# Patient Record
Sex: FEMALE | Race: WHITE | NOT HISPANIC OR LATINO | Employment: UNEMPLOYED | URBAN - METROPOLITAN AREA
[De-identification: names, ages, dates, MRNs, and addresses within clinical notes are randomized per-mention and may not be internally consistent; named-entity substitution may affect disease eponyms.]

---

## 2021-06-02 ENCOUNTER — RECORDS - HEALTHEAST (OUTPATIENT)
Dept: ADMINISTRATIVE | Facility: CLINIC | Age: 31
End: 2021-06-02

## 2022-02-12 ENCOUNTER — TRANSFERRED RECORDS (OUTPATIENT)
Dept: HEALTH INFORMATION MANAGEMENT | Facility: CLINIC | Age: 32
End: 2022-02-12
Payer: COMMERCIAL

## 2022-02-12 ENCOUNTER — HOSPITAL ENCOUNTER (INPATIENT)
Facility: CLINIC | Age: 32
LOS: 1 days | Discharge: HOME HEALTH | End: 2022-02-14
Attending: OBSTETRICS & GYNECOLOGY | Admitting: REGISTERED NURSE
Payer: COMMERCIAL

## 2022-02-12 ENCOUNTER — MEDICAL CORRESPONDENCE (OUTPATIENT)
Dept: HEALTH INFORMATION MANAGEMENT | Facility: CLINIC | Age: 32
End: 2022-02-12
Payer: COMMERCIAL

## 2022-02-12 PROCEDURE — 722N000001 HC LABOR CARE VAGINAL DELIVERY SINGLE

## 2022-02-13 PROBLEM — U07.1 SARS-COV-2 POSITIVE: Status: ACTIVE | Noted: 2022-02-13

## 2022-02-13 PROBLEM — Z67.91 RH NEGATIVE STATE IN ANTEPARTUM PERIOD: Status: ACTIVE | Noted: 2022-02-13

## 2022-02-13 PROBLEM — O26.899 RH NEGATIVE STATE IN ANTEPARTUM PERIOD: Status: ACTIVE | Noted: 2022-02-13

## 2022-02-13 PROBLEM — Z36.89 ENCOUNTER FOR TRIAGE IN PREGNANT PATIENT: Status: ACTIVE | Noted: 2022-02-13

## 2022-02-13 PROBLEM — R03.0 ELEVATED BLOOD PRESSURE READING WITHOUT DIAGNOSIS OF HYPERTENSION: Status: ACTIVE | Noted: 2022-02-13

## 2022-02-13 PROBLEM — O26.00 EXCESSIVE WEIGHT GAIN DURING PREGNANCY, ANTEPARTUM: Status: ACTIVE | Noted: 2022-02-13

## 2022-02-13 PROBLEM — Z34.93 NORMAL PREGNANCY IN THIRD TRIMESTER: Status: ACTIVE | Noted: 2022-02-13

## 2022-02-13 PROBLEM — O47.9 THREATENED LABOR AT TERM: Status: ACTIVE | Noted: 2022-02-13

## 2022-02-13 PROBLEM — N93.9 VAGINAL BLEEDING: Status: ACTIVE | Noted: 2022-02-13

## 2022-02-13 LAB
ABO/RH(D): ABNORMAL
ALT SERPL W P-5'-P-CCNC: 22 U/L (ref 0–50)
ANTIBODY ID: NORMAL
ANTIBODY SCREEN: POSITIVE
AST SERPL W P-5'-P-CCNC: 14 U/L (ref 0–45)
CREAT SERPL-MCNC: 0.71 MG/DL (ref 0.52–1.04)
CREAT UR-MCNC: 219 MG/DL
ERYTHROCYTE [DISTWIDTH] IN BLOOD BY AUTOMATED COUNT: 12.7 % (ref 10–15)
GFR SERPL CREATININE-BSD FRML MDRD: >90 ML/MIN/1.73M2
HCT VFR BLD AUTO: 42.3 % (ref 35–47)
HGB BLD-MCNC: 13.8 G/DL (ref 11.7–15.7)
MCH RBC QN AUTO: 29.6 PG (ref 26.5–33)
MCHC RBC AUTO-ENTMCNC: 32.6 G/DL (ref 31.5–36.5)
MCV RBC AUTO: 91 FL (ref 78–100)
PLATELET # BLD AUTO: 226 10E3/UL (ref 150–450)
PROT UR-MCNC: 0.29 G/L
PROT/CREAT 24H UR: 0.13 G/G CR (ref 0–0.2)
RBC # BLD AUTO: 4.67 10E6/UL (ref 3.8–5.2)
SPECIMEN EXPIRATION DATE: ABNORMAL
SPECIMEN EXPIRATION DATE: NORMAL
T PALLIDUM AB SER QL: NONREACTIVE
WBC # BLD AUTO: 20.7 10E3/UL (ref 4–11)

## 2022-02-13 PROCEDURE — 84450 TRANSFERASE (AST) (SGOT): CPT | Performed by: REGISTERED NURSE

## 2022-02-13 PROCEDURE — 59409 OBSTETRICAL CARE: CPT | Performed by: REGISTERED NURSE

## 2022-02-13 PROCEDURE — 250N000013 HC RX MED GY IP 250 OP 250 PS 637: Performed by: REGISTERED NURSE

## 2022-02-13 PROCEDURE — 84460 ALANINE AMINO (ALT) (SGPT): CPT | Performed by: REGISTERED NURSE

## 2022-02-13 PROCEDURE — 0HQ9XZZ REPAIR PERINEUM SKIN, EXTERNAL APPROACH: ICD-10-PCS | Performed by: REGISTERED NURSE

## 2022-02-13 PROCEDURE — 36415 COLL VENOUS BLD VENIPUNCTURE: CPT | Performed by: REGISTERED NURSE

## 2022-02-13 PROCEDURE — 82565 ASSAY OF CREATININE: CPT | Performed by: REGISTERED NURSE

## 2022-02-13 PROCEDURE — 120N000002 HC R&B MED SURG/OB UMMC

## 2022-02-13 PROCEDURE — 85027 COMPLETE CBC AUTOMATED: CPT | Performed by: REGISTERED NURSE

## 2022-02-13 PROCEDURE — 86850 RBC ANTIBODY SCREEN: CPT | Performed by: REGISTERED NURSE

## 2022-02-13 PROCEDURE — 86780 TREPONEMA PALLIDUM: CPT | Performed by: REGISTERED NURSE

## 2022-02-13 PROCEDURE — 86870 RBC ANTIBODY IDENTIFICATION: CPT | Performed by: REGISTERED NURSE

## 2022-02-13 PROCEDURE — 250N000011 HC RX IP 250 OP 636: Performed by: REGISTERED NURSE

## 2022-02-13 PROCEDURE — 84156 ASSAY OF PROTEIN URINE: CPT | Performed by: REGISTERED NURSE

## 2022-02-13 PROCEDURE — 250N000009 HC RX 250: Performed by: REGISTERED NURSE

## 2022-02-13 PROCEDURE — 86901 BLOOD TYPING SEROLOGIC RH(D): CPT | Performed by: REGISTERED NURSE

## 2022-02-13 RX ORDER — METHYLERGONOVINE MALEATE 0.2 MG/ML
200 INJECTION INTRAVENOUS
Status: DISCONTINUED | OUTPATIENT
Start: 2022-02-13 | End: 2022-02-14 | Stop reason: HOSPADM

## 2022-02-13 RX ORDER — PROCHLORPERAZINE 25 MG
25 SUPPOSITORY, RECTAL RECTAL EVERY 12 HOURS PRN
Status: DISCONTINUED | OUTPATIENT
Start: 2022-02-13 | End: 2022-02-13

## 2022-02-13 RX ORDER — CARBOPROST TROMETHAMINE 250 UG/ML
250 INJECTION, SOLUTION INTRAMUSCULAR
Status: DISCONTINUED | OUTPATIENT
Start: 2022-02-13 | End: 2022-02-13

## 2022-02-13 RX ORDER — TRANEXAMIC ACID 10 MG/ML
1 INJECTION, SOLUTION INTRAVENOUS EVERY 30 MIN PRN
Status: DISCONTINUED | OUTPATIENT
Start: 2022-02-13 | End: 2022-02-14 | Stop reason: HOSPADM

## 2022-02-13 RX ORDER — NALOXONE HYDROCHLORIDE 0.4 MG/ML
0.2 INJECTION, SOLUTION INTRAMUSCULAR; INTRAVENOUS; SUBCUTANEOUS
Status: DISCONTINUED | OUTPATIENT
Start: 2022-02-13 | End: 2022-02-13

## 2022-02-13 RX ORDER — SODIUM CHLORIDE, SODIUM LACTATE, POTASSIUM CHLORIDE, CALCIUM CHLORIDE 600; 310; 30; 20 MG/100ML; MG/100ML; MG/100ML; MG/100ML
INJECTION, SOLUTION INTRAVENOUS CONTINUOUS
Status: DISCONTINUED | OUTPATIENT
Start: 2022-02-13 | End: 2022-02-13

## 2022-02-13 RX ORDER — MODIFIED LANOLIN
OINTMENT (GRAM) TOPICAL
Status: DISCONTINUED | OUTPATIENT
Start: 2022-02-13 | End: 2022-02-14 | Stop reason: HOSPADM

## 2022-02-13 RX ORDER — METOCLOPRAMIDE 10 MG/1
10 TABLET ORAL EVERY 6 HOURS PRN
Status: DISCONTINUED | OUTPATIENT
Start: 2022-02-13 | End: 2022-02-13

## 2022-02-13 RX ORDER — MISOPROSTOL 200 UG/1
400 TABLET ORAL
Status: DISCONTINUED | OUTPATIENT
Start: 2022-02-13 | End: 2022-02-13

## 2022-02-13 RX ORDER — IBUPROFEN 800 MG/1
800 TABLET, FILM COATED ORAL EVERY 6 HOURS PRN
Status: DISCONTINUED | OUTPATIENT
Start: 2022-02-13 | End: 2022-02-14 | Stop reason: HOSPADM

## 2022-02-13 RX ORDER — OXYTOCIN 10 [USP'U]/ML
10 INJECTION, SOLUTION INTRAMUSCULAR; INTRAVENOUS
Status: DISCONTINUED | OUTPATIENT
Start: 2022-02-13 | End: 2022-02-13

## 2022-02-13 RX ORDER — OXYTOCIN 10 [USP'U]/ML
10 INJECTION, SOLUTION INTRAMUSCULAR; INTRAVENOUS
Status: DISCONTINUED | OUTPATIENT
Start: 2022-02-13 | End: 2022-02-14 | Stop reason: HOSPADM

## 2022-02-13 RX ORDER — DOCUSATE SODIUM 100 MG/1
100 CAPSULE, LIQUID FILLED ORAL DAILY
Status: DISCONTINUED | OUTPATIENT
Start: 2022-02-13 | End: 2022-02-14 | Stop reason: HOSPADM

## 2022-02-13 RX ORDER — MISOPROSTOL 200 UG/1
800 TABLET ORAL
Status: DISCONTINUED | OUTPATIENT
Start: 2022-02-13 | End: 2022-02-14 | Stop reason: HOSPADM

## 2022-02-13 RX ORDER — TRANEXAMIC ACID 10 MG/ML
1 INJECTION, SOLUTION INTRAVENOUS EVERY 30 MIN PRN
Status: DISCONTINUED | OUTPATIENT
Start: 2022-02-13 | End: 2022-02-13

## 2022-02-13 RX ORDER — MISOPROSTOL 200 UG/1
400 TABLET ORAL
Status: DISCONTINUED | OUTPATIENT
Start: 2022-02-13 | End: 2022-02-14 | Stop reason: HOSPADM

## 2022-02-13 RX ORDER — PROCHLORPERAZINE MALEATE 10 MG
10 TABLET ORAL EVERY 6 HOURS PRN
Status: DISCONTINUED | OUTPATIENT
Start: 2022-02-13 | End: 2022-02-13

## 2022-02-13 RX ORDER — BISACODYL 10 MG
10 SUPPOSITORY, RECTAL RECTAL DAILY PRN
Status: DISCONTINUED | OUTPATIENT
Start: 2022-02-13 | End: 2022-02-14 | Stop reason: HOSPADM

## 2022-02-13 RX ORDER — LIDOCAINE 40 MG/G
CREAM TOPICAL
Status: DISCONTINUED | OUTPATIENT
Start: 2022-02-13 | End: 2022-02-13 | Stop reason: HOSPADM

## 2022-02-13 RX ORDER — ONDANSETRON 2 MG/ML
4 INJECTION INTRAMUSCULAR; INTRAVENOUS EVERY 6 HOURS PRN
Status: DISCONTINUED | OUTPATIENT
Start: 2022-02-13 | End: 2022-02-13

## 2022-02-13 RX ORDER — FENTANYL CITRATE 50 UG/ML
50-100 INJECTION, SOLUTION INTRAMUSCULAR; INTRAVENOUS
Status: DISCONTINUED | OUTPATIENT
Start: 2022-02-13 | End: 2022-02-13

## 2022-02-13 RX ORDER — HYDROCORTISONE 2.5 %
CREAM (GRAM) TOPICAL 3 TIMES DAILY PRN
Status: DISCONTINUED | OUTPATIENT
Start: 2022-02-13 | End: 2022-02-14 | Stop reason: HOSPADM

## 2022-02-13 RX ORDER — MISOPROSTOL 200 UG/1
800 TABLET ORAL
Status: DISCONTINUED | OUTPATIENT
Start: 2022-02-13 | End: 2022-02-13

## 2022-02-13 RX ORDER — METHYLERGONOVINE MALEATE 0.2 MG/ML
200 INJECTION INTRAVENOUS
Status: DISCONTINUED | OUTPATIENT
Start: 2022-02-13 | End: 2022-02-13

## 2022-02-13 RX ORDER — OXYTOCIN/0.9 % SODIUM CHLORIDE 30/500 ML
100-340 PLASTIC BAG, INJECTION (ML) INTRAVENOUS CONTINUOUS PRN
Status: DISCONTINUED | OUTPATIENT
Start: 2022-02-13 | End: 2022-02-13

## 2022-02-13 RX ORDER — CARBOPROST TROMETHAMINE 250 UG/ML
250 INJECTION, SOLUTION INTRAMUSCULAR
Status: DISCONTINUED | OUTPATIENT
Start: 2022-02-13 | End: 2022-02-14 | Stop reason: HOSPADM

## 2022-02-13 RX ORDER — ONDANSETRON 4 MG/1
4 TABLET, ORALLY DISINTEGRATING ORAL EVERY 6 HOURS PRN
Status: DISCONTINUED | OUTPATIENT
Start: 2022-02-13 | End: 2022-02-13

## 2022-02-13 RX ORDER — OXYTOCIN/0.9 % SODIUM CHLORIDE 30/500 ML
340 PLASTIC BAG, INJECTION (ML) INTRAVENOUS CONTINUOUS PRN
Status: DISCONTINUED | OUTPATIENT
Start: 2022-02-13 | End: 2022-02-13

## 2022-02-13 RX ORDER — ACETAMINOPHEN 325 MG/1
650 TABLET ORAL EVERY 4 HOURS PRN
Status: DISCONTINUED | OUTPATIENT
Start: 2022-02-13 | End: 2022-02-14 | Stop reason: HOSPADM

## 2022-02-13 RX ORDER — NALOXONE HYDROCHLORIDE 0.4 MG/ML
0.4 INJECTION, SOLUTION INTRAMUSCULAR; INTRAVENOUS; SUBCUTANEOUS
Status: DISCONTINUED | OUTPATIENT
Start: 2022-02-13 | End: 2022-02-13

## 2022-02-13 RX ORDER — KETOROLAC TROMETHAMINE 30 MG/ML
30 INJECTION, SOLUTION INTRAMUSCULAR; INTRAVENOUS
Status: DISCONTINUED | OUTPATIENT
Start: 2022-02-13 | End: 2022-02-13

## 2022-02-13 RX ORDER — OXYTOCIN/0.9 % SODIUM CHLORIDE 30/500 ML
340 PLASTIC BAG, INJECTION (ML) INTRAVENOUS CONTINUOUS PRN
Status: DISCONTINUED | OUTPATIENT
Start: 2022-02-13 | End: 2022-02-14 | Stop reason: HOSPADM

## 2022-02-13 RX ORDER — METOCLOPRAMIDE HYDROCHLORIDE 5 MG/ML
10 INJECTION INTRAMUSCULAR; INTRAVENOUS EVERY 6 HOURS PRN
Status: DISCONTINUED | OUTPATIENT
Start: 2022-02-13 | End: 2022-02-13

## 2022-02-13 RX ORDER — IBUPROFEN 600 MG/1
600 TABLET, FILM COATED ORAL
Status: DISCONTINUED | OUTPATIENT
Start: 2022-02-13 | End: 2022-02-13

## 2022-02-13 RX ADMIN — IBUPROFEN 800 MG: 800 TABLET, FILM COATED ORAL at 22:31

## 2022-02-13 RX ADMIN — MISOPROSTOL 400 MCG: 200 TABLET ORAL at 05:42

## 2022-02-13 RX ADMIN — ACETAMINOPHEN 650 MG: 325 TABLET, FILM COATED ORAL at 22:31

## 2022-02-13 RX ADMIN — Medication 340 ML/HR: at 05:42

## 2022-02-13 RX ADMIN — KETOROLAC TROMETHAMINE 30 MG: 30 INJECTION, SOLUTION INTRAMUSCULAR at 06:27

## 2022-02-13 RX ADMIN — DOCUSATE SODIUM 100 MG: 100 CAPSULE, LIQUID FILLED ORAL at 08:09

## 2022-02-13 RX ADMIN — HUMAN RHO(D) IMMUNE GLOBULIN 300 MCG: 1500 SOLUTION INTRAMUSCULAR; INTRAVENOUS at 22:20

## 2022-02-13 RX ADMIN — ACETAMINOPHEN 650 MG: 325 TABLET, FILM COATED ORAL at 09:09

## 2022-02-13 ASSESSMENT — ACTIVITIES OF DAILY LIVING (ADL)
TOILETING_ISSUES: NO
FALL_HISTORY_WITHIN_LAST_SIX_MONTHS: NO

## 2022-02-13 ASSESSMENT — MIFFLIN-ST. JEOR: SCORE: 1556.84

## 2022-02-13 NOTE — PLAN OF CARE
VSS. Pt denies need for PRN pain medications at this time, stated she will call RN if Tylenol or Ibuprofen needed. Pt breastfeeding infant with assistance for latch. Pt also encouraged to hand express after each feeding to help bring in strong supply. Pt tolerating regular diet, ambulating in room, and voiding without difficulty. Postpartum checks WNL. Pt and spouse bonding well with infant.

## 2022-02-13 NOTE — PROGRESS NOTES
Patient arrived to [facility] unit via wheelchair at 1310,with belongings, accompanied by spouse/ significant other, with infant in arms. Received report from Chelsea Clifford RN and checked bands. Unit and room orientation completd. Call light given and within arms reach; no concerns present at this time. Continue with plan of care.

## 2022-02-13 NOTE — PROGRESS NOTES
"Labor progress note    S:  Feeling urge to push and requesting cervical exam    O:  Blood pressure 111/79, height 1.575 m (5' 2\"), weight 89.4 kg (197 lb).  General appearance: uncomfortable with contractions.  Contractions: Every 2-3 minutes, intermittently coupling. Tracing via HELEN. 60-90 seconds duration.  Palpate: strong.  FHT: Baseline 150 with moderate variability. Accelerations present. Early decelerations present.  ROM: moderate meconium fluid. Membranes have been ruptured for 0 hours.  Pelvic exam: 10/ 100%/ Anterior/ soft/ 0 to +1  -------------------------------  Pitocin- none,  Antibiotics- none    A:  IUP @ 39+3 second stage labor   Fetal Heart rate tracing Category one overall  GBS- negative  Patient Active Problem List   Diagnosis     Encounter for triage in pregnant patient     Threatened labor at term     Normal pregnancy in third trimester     SARS-CoV-2 positive     Rh negative state in antepartum period     Vaginal bleeding     Excessive weight gain during pregnancy, antepartum         P:  Begin pushing     OG Zambrano CNM      "

## 2022-02-13 NOTE — PROVIDER NOTIFICATION
02/13/22 1510   Provider Notification   Provider Name/Title ELAINA Man CNM   Method of Notification Phone   Notification Reason Other (Comment)     Prior to start of 0700 shift, placenta was released to pt and picked up by ned for planned encapsulation.   Release form signed.   Pt states that  aware that pt is COVID +, has not discussed risk associated with encapsulation given current COVID status.   CNM to follow up with pt and discuss plan for placenta.

## 2022-02-13 NOTE — L&D DELIVERY NOTE
Delivery Summary    Maribel Lopez MRN# [id number]   Age: 31 year old YOB: 1990     ASSESSMENT & PLAN:     Delivery Note    Labor Course:   Patient was transferred to [facility] labor and delivery from [facility] 2/13/22 at 2330 for r/o preeclampsia and r/o abruption. She had begun to have painful labor contractions at 1830. At the birth center she arrived in early labor and SVE was 0-1cm/60%/-2/post/soft. She was found to have elevated BP x2 133/97 and repeat 20 minutes later was 121/93 so transferring midwife sent her by car for preeclampsia workup. Upon arrival to the unit she was brian every 2-3 minutes and they palpated moderate. Bps normotensive, HELLP labs were all WNL. Small amount of bloody show and non-tender uterus and Category I FHR tracing suggested low clinical suspicion for abruption. Her birth plan stated she wanted minimal cervical exams, so she continued to labor spontaneously. She labored in hands and knees, on birthing ball and in the tub. At 96331 she reported urge to push and was pushing involuntarily with contractions. At 0345 SVE C/C/+1, moderate amount of meconium stained fluid was noted on exam. She pushed spontaneously with verbal guidance and coaching during contractions. She pushed in both right and left side-lying positions and squatting. NICU called to delivery due to meconium-stained fluid. Infant delivered just prior to their arrival.     Delivery Course:  Pt became complete at 0348 and started pushing 0355. Delivered a vigorous baby male at 0520 who was immediately placed on mom's abdomen. Loose nuchal delivered through. IV pitocin started after delivery of infant per protocol. Umbilical cord was double clamped and cut by Vitaliy (MARCELLE) after the cord stopped pulsing. Cord segment cut for gases. Cord blood obtained. Placenta spontaneously delivered intact at 0527 without difficulty via Schultze mechanism. Inspection of vagina and perineum revealed a 1st degree  laceration that was oozing after pressure held to site, so laceration was repaired in the usual fashion with 3-0 vicryl. 1% lidocaine was infiltrated before the repair.  Fundus is firm and midline.  Mom and baby are stable.      IUP at 39+3 weeks gestation delivered on 2022.    , with 1st degree laceration delivery of a viable Male infant.  Weight : 7lb 4.8oz  Apgars of 6 at 1 minute and 7 at 5 minutes and 9 at 10 minutes.  Labor was spontaneous.  Medications administered  in labor:  Pain Rx none; Antibiotics No;   Perineum: 1st degree  Placenta-mechanism: spontaneous, intact,  with a 3 vessel cord. IV oxytocin was given. Buccal misoprostol was given for moderate, brisk bleeding after delivery of placenta.   QBL was 395.  Anticipated Discharge Date: 22  Complications of pregnancy, labor and delivery: meconium stained fluid  Birth attendants:OG Zambrano CNM, REGLA FOLEYkkeBreanna [[id number]]    Labor Event Times    Labor onset date: 22 Onset time:  6:30 PM   Dilation complete date: 22 Complete time:  3:48 AM   Start pushing date/time: 2022 0355      Labor Events     labor?: No   steroids: None  Labor Type: Spontaneous  Predominate monitoring during 1st stage: continuous electronic fetal monitoring     Antibiotics received during labor?: No     Rupture identifier: Sac 1  Rupture date/time: 22 0340   Rupture type: Spontaneous rupture of membranes occuring during spontaneous labor or augmentation  Fluid color: Meconium     Augmentation: None  1:1 continuous labor support provided by?: RN       Delivery/Placenta Date and Time    Delivery Date: 22 Delivery Time:  5:20 AM   Placenta Date/Time: 2022  5:27 AM  Oxytocin given at the time of delivery: after delivery of placenta  Delivering clinician: Sharmila Oconnell APRN CNM          Apgars    Living status: Living   1 Minute 5 Minute 10 Minute 15 Minute 20 Minute   Skin color:  0  0  1      Heart rate: 2  2  2      Reflex irritability: 2  2  2      Muscle tone: 1  2  2      Respiratory effort: 1  1  2      Total: 6  7  9      Apgars assigned by: ANGELIC HANCOCK RN & MAY PARKRN     Cord    Vessels: 3 Vessels    Cord Complications: None               Cord Blood Disposition: Lab    Gases Sent?: Yes    Delayed cord clamping?: Yes    Cord Clamping Delay (seconds): >120 seconds    Stem cell collection?: No        Resuscitation    Methods: Suctioning, Other, Oximetry  Tracheal Suction Passes: 1 Tracheal Returns: Meconium    [place] Care at Delivery: Called to delivery of term male infant. NICU called for meconium stained fluid and then initially dismissed due to vigorous cry. At 5 minute APGAR, infant pale, tachycardic and tachypnic. Infant brought to warmer and NNP called back. See provider note for interventions. NNP performed 1 tracheal suction pass with meconium return, and infant's vitals improved. Infant brought back to mom, no change to standard's of care. Will continue to monitor closely.     Angelic Flores RN on 2022 at 6:06 AM       Labor Events and Shoulder Dystocia    Fetal Tracing Prior to Delivery: Category 2  Fetal Tracing Comments: moderate variability; variable decelerations with pushing contractions  Shoulder dystocia present?: Neg     Delivery (Maternal) (Provider to Complete) ([id number])    Episiotomy: None  Perineal lacerations: 1st Repaired?: Yes   Repair suture: 3-0 Vicryl  Number of repair packets: 1  Genital tract inspection done: Pos     Blood Loss  Mother: Maribel Lopez #[id number]   Start of Mother's Information    Delivery Blood Loss  22 1830 - 22 0624    Delivery QBL (mL) Hospital Encounter 395 mL    Total  395 mL         End of Mother's Information  Mother: Maribel Lopez #[id number]          Delivery - Provider to Complete ([id number])    Delivering clinician: Sharmila Oconnell APRN CNM  CNM Care: Any CNM care in labor, Exclusive CNM care in  labor  Attempted Delivery Types (Choose all that apply): Spontaneous Vaginal Delivery  Delivery Type (Choose the 1 that will go to the Birth History): Vaginal, Spontaneous                                 Placenta    Date/Time: 2/13/2022  5:27 AM  Removal: Spontaneous  Disposition: Patient possesion           Anesthesia    Method: None                Presentation and Position    Presentation: Vertex    Position: Left Occiput Anterior                 OG Zambrano CNM

## 2022-02-13 NOTE — H&P
"ADMIT NOTE  =================  Unknown    Maribel Lopez is a 31 year old female with an No LMP recorded. Patient is pregnant. and Estimated Date of Delivery: 22 is admitted to the Birthplace on 2022 at 11:32 PM with in early labor, r/o preeclampsia, r/o abruption.     HPI  ================  Patient presented to [facility] with signs of early labor. Upon arrival she had an elevated /97 @ 2123 and the repeat BP was 121/93 @ 2158. She denies HA, visual changes, RUQ pain. It was also noted that the patient had a \"moderate amount of dark red bleeding\" noted after her vaginal exam.      She is also COVID+. Became symptomatic 22 and tested positive 22. She feels well now and is no longer symptomatic.     Patient with planned Out of Hospital Birth is transferring by car to the hospital for r/o preeclampsia and r/o abruption.   Patient has been seen by [facility] for prenatal care.  Transferring midwife name(s),/birth center & phone number: Marj Hunter, [phone]  Pager # [phone] option #1   Midwife here supporting patient: no  Records received, reviewed and scanned into chart.   [facility] SBAR transfer tool was used: yes    Contractions- moderate  Fetal movement- active  ROM- no   Vaginal bleeding- 1 clot in the toilet at 1830 per patient and small amount since. Per transferring midwife, vaginal bleeding appeared to be more than expected with bloody show.   GBS- negative  FOB- is involved, Vitaliy  Other labor support- no, considered COVID+    Weight gain- 197 - 154 lbs, Total weight gain- 43 lbs  Height- 5ft 2in  BMI- 27  First prenatal visit at 9 weeks, Total visits- 9    PROBLEM LIST  =================  Patient Active Problem List    Diagnosis Date Noted     Encounter for triage in pregnant patient 2022     Priority: Medium     Threatened labor at term 2022     Priority: Medium     Normal pregnancy in third trimester 2022     Priority: " Medium     SARS-CoV-2 positive 2022     Priority: Medium     Symptomatic 22  Lab test positive 2022       Rh negative state in antepartum period 2022     Priority: Medium     Received rhogam 21       Vaginal bleeding 2022     Priority: Medium     SHAMAR Henrietta BC r/o abruption       Excessive weight gain during pregnancy, antepartum 2022     Priority: Medium     43lbs (BMI prepreg 27)         HISTORIES  ============  Allergies   Allergen Reactions     Mixed Vespid Venom Anaphylaxis and Nausea     Amoxicillin Nausea and Hives     Doxycycline Nausea     No past medical history on file.  No past surgical history on file..  No family history on file.  Social History     Tobacco Use     Smoking status: Not on file     Smokeless tobacco: Not on file   Substance Use Topics     Alcohol use: Not on file     OB History    Para Term  AB Living   1 0 0 0 0 0   SAB IAB Ectopic Multiple Live Births   0 0 0 0 0      # Outcome Date GA Lbr Jose/2nd Weight Sex Delivery Anes PTL Lv   1 Current                 LABS:   ===========  Prenatal Labs reviewed per transfer records:   Rhogam indicated and given on 2021  ABO/ RH: B NEG (antibody negative)  Rubella immune   HBsAg: negative   HIV: negative   RPR: NR   GCT: date 2021, result passed @ 96   GBS: date 22, result negative  OTHER: gc/ct negative  hgb last 12.1  Hep C negative  Urine culture negative       Lab Results   Component Value Date    HGB 13.8 2022     No results found for: GBS  Other labs:  Results for orders placed or performed during the hospital encounter of 22 (from the past 24 hour(s))   CBC with platelets   Result Value Ref Range    WBC Count 20.7 (H) 4.0 - 11.0 10e3/uL    RBC Count 4.67 3.80 - 5.20 10e6/uL    Hemoglobin 13.8 11.7 - 15.7 g/dL    Hematocrit 42.3 35.0 - 47.0 %    MCV 91 78 - 100 fL    MCH 29.6 26.5 - 33.0 pg    MCHC 32.6 31.5 - 36.5 g/dL    RDW 12.7 10.0 - 15.0 %    Platelet Count  226 150 - 450 10e3/uL   ABO/Rh type and screen    Narrative    The following orders were created for panel order ABO/Rh type and screen.  Procedure                               Abnormality         Status                     ---------                               -----------         ------                     Adult Type and Screen[[id number]]                            In process                   Please view results for these tests on the individual orders.       ULTRASOUND  =============  Date 10/4/21, result normal fetal survey    ROS  =========  Pt denies significant respiratory, cardiovacular, GI, or muscular/skeletalcomplaints.    See RN data base ROS.       PHYSICAL EXAM:  ===============  There were no vitals taken for this visit.  General appearance: uncomfortable with contractions  GENERAL APPEARANCE: healthy, alert and no distress  RESP: regular rate and rhythm of breathing.   CV: pulse regular  ABDOMEN:  soft, nontender to palpation, no epigastric pain  SKIN: no suspicious lesions or rashes  NEURO: Denies headache, blurred vision, other vision changes  PSYCH: mentation appears normal. and affect normal/bright  Legs: Reflexes normal bilaterally and No edema     Abdomen: gravid, vertex fetus per Leopold's, non-tender between contractions.   Cephalic presentation confirmed by BSUS  EFW-  7 lbs.   CONTACTIONS: every 2-3 minutes and moderate  FETAL HEART TONES: continuous EFM- baseline 150 with moderate variability and positive accelerations. No decelerations traced. Difficult EFM tracing due to maternal position changes.   PELVIC EXAM: deferred. At 2045 was FT/60%/-2/post/soft  ALLEN SCORE: 5  BLOODY SHOW: yes.    ROM:no  FLUID: none  ROMPLUS: not done    # Pain Assessment:   - Maribel is experiencing pain due to labor contractions. Pain management was discussed with Maribel and her significant other and the plan was created in a collaborative fashion.  Maribel's response to the current recommendations:  engaged  - Non-pharmacologic adjuvants: Massage   - Planning un-medicated delivery        ASSESSMENT:  ==============  IUP @ 39+3 admitted in early labor   NST REACTIVE  Fetal Heart Rate - category one  GBS- negative  COVID+ (still in 10 day window)    PLAN:  ===========  Admit - see IP orders  Continuous EFM   Per patient birth plan, she is aware of pain medication options of nitrous oxide, fentanyl IV and epidural anesthesia and does not want to be offered any. She is aware that she cannot have nitrous oxide due to COVID+ status.   Ambulation, hydration, position changes, birthing ball and tub options to facilitate labor reviewed with pt .  Anticipate   OG Zambrano CNM

## 2022-02-14 VITALS
RESPIRATION RATE: 18 BRPM | BODY MASS INDEX: 36.25 KG/M2 | HEIGHT: 62 IN | WEIGHT: 197 LBS | DIASTOLIC BLOOD PRESSURE: 78 MMHG | SYSTOLIC BLOOD PRESSURE: 113 MMHG | HEART RATE: 85 BPM | TEMPERATURE: 97.8 F

## 2022-02-14 LAB — HGB BLD-MCNC: 11.1 G/DL (ref 11.7–15.7)

## 2022-02-14 PROCEDURE — 85018 HEMOGLOBIN: CPT | Performed by: REGISTERED NURSE

## 2022-02-14 PROCEDURE — 250N000013 HC RX MED GY IP 250 OP 250 PS 637: Performed by: REGISTERED NURSE

## 2022-02-14 PROCEDURE — 36415 COLL VENOUS BLD VENIPUNCTURE: CPT | Performed by: REGISTERED NURSE

## 2022-02-14 RX ORDER — ACETAMINOPHEN 325 MG/1
650 TABLET ORAL EVERY 6 HOURS PRN
Qty: 100 TABLET | Refills: 0 | COMMUNITY
Start: 2022-02-14

## 2022-02-14 RX ORDER — AMOXICILLIN 250 MG
1 CAPSULE ORAL DAILY
Qty: 100 TABLET | Refills: 0 | COMMUNITY
Start: 2022-02-14

## 2022-02-14 RX ORDER — IBUPROFEN 600 MG/1
600 TABLET, FILM COATED ORAL EVERY 6 HOURS PRN
Qty: 60 TABLET | Refills: 0 | COMMUNITY
Start: 2022-02-14

## 2022-02-14 RX ORDER — .BETA.-CAROTENE, ASCORBIC ACID, CHOLECALCIFEROL, .ALPHA.-TOCOPHEROL ACETATE, DL-, THIAMINE MONONITRATE, RIBOFLAVIN, NIACINAMIDE, PYRIDOXINE HYDROCHLORIDE, FOLIC ACID, CYANOCOBALAMIN, CALCIUM PANTOTHENATE, CALCIUM CARBONATE, FERROUS FUMARATE, ZINC OXIDE, AND DOCUSATE SODIUM 1000; 100; 400; 30; 3; 3; 15; 20; 1; 12; 7; 200; 29; 20; 25 [IU]/1; MG/1; [IU]/1; [IU]/1; MG/1; MG/1; MG/1; MG/1; MG/1; UG/1; MG/1; MG/1; MG/1; MG/1; MG/1
1 TABLET, COATED ORAL DAILY
Qty: 90 TABLET | Refills: 0 | COMMUNITY
Start: 2022-02-14

## 2022-02-14 RX ADMIN — IBUPROFEN 800 MG: 800 TABLET, FILM COATED ORAL at 08:54

## 2022-02-14 RX ADMIN — DOCUSATE SODIUM 100 MG: 100 CAPSULE, LIQUID FILLED ORAL at 08:54

## 2022-02-14 RX ADMIN — ACETAMINOPHEN 650 MG: 325 TABLET, FILM COATED ORAL at 08:54

## 2022-02-14 NOTE — PLAN OF CARE
Afebrile. VSS, except 2/10. LS clear on RA. Good PO intake, good appetite. Voiding independently, passing gas. Taking IBU and tylenol as needed for pain. Bonding well with infant in room. Patient is breast feeding every 2-3 hours. Patient discharged. All education reviewed, questions addressed, medication reviewed and verified has medications at home. EDS 4, BC completed, Videos reviewed. Has a Pump at home. Plan to discharge. Hourly monitoring completed. Discharged at 1133.

## 2022-02-14 NOTE — DISCHARGE SUMMARY
[facility] Discharge Summary    Maribel Lopez MRN# [id number]   Age: 31 year old YOB: 1990     Date of Admission:  2022  Date of Discharge::  2022  Admitting Physician:  OG Stallworth CNM  Discharge Physician:  OG Acevedo CNM      Home clinic: [facility]          Admission Diagnoses:   Threatened labor at term [O47.9]          Discharge Diagnosis:     Normal spontaneous vaginal delivery  Intrauterine pregnancy at 39 weeks gestation          Procedures:     Procedure(s): Repair of first degree perineal laceration       No other significant procedures performed during this admission           Medications Prior to Admission:     No medications prior to admission.             Discharge Medications:     Current Discharge Medication List      START taking these medications    Details   acetaminophen (TYLENOL) 325 MG tablet Take 2 tablets (650 mg) by mouth every 6 hours as needed for mild pain Start after Delivery.  Qty: 100 tablet, Refills: 0    Associated Diagnoses:  (normal spontaneous vaginal delivery)      ibuprofen (ADVIL/MOTRIN) 600 MG tablet Take 1 tablet (600 mg) by mouth every 6 hours as needed for moderate pain Start after delivery  Qty: 60 tablet, Refills: 0    Associated Diagnoses:  (normal spontaneous vaginal delivery)      Prenatal Vit-DSS-Fe Fum-FA (PRENATAL 19) 29-1 MG TABS Take 1 tablet by mouth daily  Qty: 90 tablet, Refills: 0    Associated Diagnoses:  (normal spontaneous vaginal delivery)      senna-docusate (SENOKOT-S/PERICOLACE) 8.6-50 MG tablet Take 1 tablet by mouth daily Start after delivery.  Qty: 100 tablet, Refills: 0    Associated Diagnoses:  (normal spontaneous vaginal delivery)                   Consultations:   No consultations were requested during this admission          Brief History of Labor:   Labor Course:   Patient was transferred to [facility] labor and delivery from [facility] 22 at 2330 for r/o  preeclampsia and r/o abruption. She had begun to have painful labor contractions at 1830. At the birth center she arrived in early labor and SVE was 0-1cm/60%/-2/post/soft. She was found to have elevated BP x2 133/97 and repeat 20 minutes later was 121/93 so transferring midwife sent her by car for preeclampsia workup. Upon arrival to the unit she was brian every 2-3 minutes and they palpated moderate. Bps normotensive, HELLP labs were all WNL. Small amount of bloody show and non-tender uterus and Category I FHR tracing suggested low clinical suspicion for abruption. Her birth plan stated she wanted minimal cervical exams, so she continued to labor spontaneously. She labored in hands and knees, on birthing ball and in the tub. At 42129 she reported urge to push and was pushing involuntarily with contractions. At 0345 SVE C/C/+1, moderate amount of meconium stained fluid was noted on exam. She pushed spontaneously with verbal guidance and coaching during contractions. She pushed in both right and left side-lying positions and squatting. NICU called to delivery due to meconium-stained fluid. Infant delivered just prior to their arrival.      Delivery Course:  Pt became complete at 0348 and started pushing 0355. Delivered a vigorous baby male at 0520 who was immediately placed on mom's abdomen. Loose nuchal delivered through. IV pitocin started after delivery of infant per protocol. Umbilical cord was double clamped and cut by Vitaliy (MARCELLE) after the cord stopped pulsing. Cord segment cut for gases. Cord blood obtained. Placenta spontaneously delivered intact at 0527 without difficulty via Schultze mechanism. Inspection of vagina and perineum revealed a 1st degree laceration that was oozing after pressure held to site, so laceration was repaired in the usual fashion with 3-0 vicryl. 1% lidocaine was infiltrated before the repair.  Fundus is firm and midline.  Mom and baby are stable.      IUP at 39+3 weeks  "gestation delivered on 2022.    , with 1st degree laceration delivery of a viable Male infant.  Weight : 7lb 4.8oz  Apgars of 6 at 1 minute and 7 at 5 minutes and 9 at 10 minutes.  Labor was spontaneous.  Medications administered  in labor:  Pain Rx none; Antibiotics No;   Perineum: 1st degree  Placenta-mechanism: spontaneous, intact,  with a 3 vessel cord. IV oxytocin was given. Buccal misoprostol was given for moderate, brisk bleeding after delivery of placenta.   QBL was 395.  Anticipated Discharge Date: 22  Complications of pregnancy, labor and delivery: meconium stained fluid  Birth attendants:Sharmila Oconnell, OG ARAUZ, OG,CNM     Assessment Day of Discharge      Breasts: soft, filling  Nipples:intact, without lesion  Fundus: firm @ umbilicus  Abdomen: soft, nontender  Lochia: small rubra, no clots,  No odor  Perineum: not visualized  Legs: trace no edema, nontender           Hospital Course:     INTERVAL HISTORY:  /80   Pulse 92   Temp 97.8  F (36.6  C) (Oral)   Resp 18   Ht 1.575 m (5' 2\")   Wt 89.4 kg (197 lb)   Breastfeeding Unknown   BMI 36.03 kg/m     No fever  Pt stable, baby boy is rooming in. Feeding well.   Breast feeding status: initiated, feeding on cue without discomfort, intact nipples.   Complications since 2 hours post delivery: None.   Patient is tolerating activity well, and normal diet. Voiding without difficulty, no bowel movement yet, cramping is minimal and is relieved by Ibuprofen, lochia is decreasing and patient denies clots.  Perineal pain is is minimal, is relieved by Ibuprophen and is relieved by ice pack.   The perineum laceration is well approximated and healing appropriately.     Postpartum hemoglobin   Recent Labs   Lab 22  0049   HGB 13.8      Blood type B NEGATIVE -  RHOPHYLAC given at 2220 on 22.   Rubella status : immune    History of depression: yes. Postpartum depression warning signs reviewed.    ASSESSMENT/PLAN:  Normal " postpartum exam , Stable Post-partum day #1  Complications:none  Home Visit Ordered- No  Plan d/c home today  Follow-up: Plans to see midwives at [facility] for postpartum care  Teaching done: D/C Instructions: Nutrition/Activity, Engorgement Management, Warning Signs/When to Call: Excessive Bleeding, Infection, PP Depression, Kegals and Crunches, [facility] Clinic for PP Appointment and PNV     Reviewed blood pressure warning signs including headache, vision changes, RUQ/epigastric pain, N&V, or sudden swelling.     Continue prenatal vitamins  Discharge medications ordered- OTC ibuprofen, tylenol, senna, PNV    Birthcontrol planned: not discussed         Discharge Instructions and Follow-Up:     Discharge diet: Regular   Discharge activity: Pelvic rest: abstain from intercourse and do not use tampons for 6 week(s)   Discharge follow-up: Follow up with [facility] in 1 week   Wound care: Ice to area for comfort           Discharge Disposition:     Discharged to home      OG Acevedo CNM

## 2022-02-14 NOTE — PLAN OF CARE
Problem: Adult Inpatient Plan of Care  Goal: Plan of Care Review  Outcome: Improving  Flowsheets (Taken 2/13/2022 2254)  Plan of Care Reviewed With: patient  Outcome Summary: Patient expressing readiness for discharge in the AM  Progress: improving  Goal: Patient-Specific Goal (Individualized)  Outcome: Improving  Flowsheets  Taken 2/13/2022 2254 by Kim Valverde RN  Patient-Specific Goals (Include Timeframe): Discharge  Taken 2/13/2022 0000 by Sandrine Verdugo RN  Individualized Care Needs: has a written birth plan given to staff  Anxieties, Fears or Concerns: fears of change in birth plan.  Goal: Absence of Hospital-Acquired Illness or Injury  Intervention: Prevent Skin Injury  Recent Flowsheet Documentation  Taken 2/13/2022 2000 by Kim Valverde RN  Body Position: position changed independently  Intervention: Prevent and Manage VTE (Venous Thromboembolism) Risk  Recent Flowsheet Documentation  Taken 2/13/2022 2000 by Kim Valverde RN  VTE Prevention/Management: fluids promoted  Intervention: Prevent Infection  Recent Flowsheet Documentation  Taken 2/13/2022 2000 by Kim Valverde RN  Infection Prevention:   visitors restricted/screened   single patient room provided   rest/sleep promoted   environmental surveillance performed   equipment surfaces disinfected   hand hygiene promoted   personal protective equipment utilized     Problem: Adjustment to Role Transition (Postpartum Vaginal Delivery)  Goal: Successful Maternal Role Transition  Intervention: Support Maternal Role Transition  Recent Flowsheet Documentation  Taken 2/13/2022 2000 by Kim Valverde RN  Supportive Measures:   active listening utilized   decision-making supported   positive reinforcement provided   problem-solving facilitated   relaxation techniques promoted   self-care encouraged   verbalization of feelings encouraged  Parent/Child Attachment Promotion:   caring behavior modeled   cue recognition promoted     Problem: Infection (Postpartum  Vaginal Delivery)  Goal: Absence of Infection Signs and Symptoms  Intervention: Prevent or Manage Infection  Recent Flowsheet Documentation  Taken 2/13/2022 2000 by Kim Valverde RN  Infection Management: aseptic technique maintained     Problem: Pain (Postpartum Vaginal Delivery)  Goal: Acceptable Pain Control  Intervention: Prevent or Manage Pain  Recent Flowsheet Documentation  Taken 2/13/2022 2000 by Kim Valverde RN  Complementary Therapy: essential oils utilized     Problem: Urinary Retention (Postpartum Vaginal Delivery)  Goal: Effective Urinary Elimination  Intervention: Promote Effective Urinary Elimination  Recent Flowsheet Documentation  Taken 2/13/2022 2000 by Kim Valverde RN  Urinary Elimination Promotion: frequent voiding encouraged     Problem: Adult Inpatient Plan of Care  Goal: Plan of Care Review  Outcome: Improving  Flowsheets (Taken 2/13/2022 2254)  Plan of Care Reviewed With: patient  Outcome Summary: Patient expressing readiness for discharge in the AM  Progress: improving  Goal: Patient-Specific Goal (Individualized)  Outcome: Improving  Flowsheets  Taken 2/13/2022 2254 by Kim Valverde RN  Patient-Specific Goals (Include Timeframe): Discharge  Taken 2/13/2022 0000 by Sandrine Verdugo RN  Individualized Care Needs: has a written birth plan given to staff  Anxieties, Fears or Concerns: fears of change in birth plan.  Goal: Absence of Hospital-Acquired Illness or Injury  Outcome: Improving  Intervention: Prevent Skin Injury  Recent Flowsheet Documentation  Taken 2/13/2022 2000 by Kim Valverde RN  Body Position: position changed independently  Intervention: Prevent and Manage VTE (Venous Thromboembolism) Risk  Recent Flowsheet Documentation  Taken 2/13/2022 2000 by Kim Valverde RN  VTE Prevention/Management: fluids promoted  Intervention: Prevent Infection  Recent Flowsheet Documentation  Taken 2/13/2022 2000 by Kim Valverde RN  Infection Prevention:   visitors restricted/screened   single  patient room provided   rest/sleep promoted   environmental surveillance performed   equipment surfaces disinfected   hand hygiene promoted   personal protective equipment utilized  Goal: Optimal Comfort and Wellbeing  Outcome: Improving  Goal: Readiness for Transition of Care  Outcome: Improving     Problem: Adjustment to Role Transition (Postpartum Vaginal Delivery)  Goal: Successful Maternal Role Transition  Outcome: Improving  Intervention: Support Maternal Role Transition  Recent Flowsheet Documentation  Taken 2/13/2022 2000 by Kim Valverde RN  Supportive Measures:   active listening utilized   decision-making supported   positive reinforcement provided   problem-solving facilitated   relaxation techniques promoted   self-care encouraged   verbalization of feelings encouraged  Parent/Child Attachment Promotion:   caring behavior modeled   cue recognition promoted     Problem: Bleeding (Postpartum Vaginal Delivery)  Goal: Hemostasis  Outcome: Improving     Problem: Infection (Postpartum Vaginal Delivery)  Goal: Absence of Infection Signs and Symptoms  Outcome: Improving  Intervention: Prevent or Manage Infection  Recent Flowsheet Documentation  Taken 2/13/2022 2000 by Kim Valverde RN  Infection Management: aseptic technique maintained     Problem: Pain (Postpartum Vaginal Delivery)  Goal: Acceptable Pain Control  Outcome: Improving  Intervention: Prevent or Manage Pain  Recent Flowsheet Documentation  Taken 2/13/2022 2000 by Kim Valverde RN  Complementary Therapy: essential oils utilized     Problem: Urinary Retention (Postpartum Vaginal Delivery)  Goal: Effective Urinary Elimination  Outcome: Improving  Intervention: Promote Effective Urinary Elimination  Recent Flowsheet Documentation  Taken 2/13/2022 2000 by Kim Valverde RN  Urinary Elimination Promotion: frequent voiding encouraged

## 2023-06-04 ENCOUNTER — HEALTH MAINTENANCE LETTER (OUTPATIENT)
Age: 33
End: 2023-06-04

## 2024-07-28 ENCOUNTER — HEALTH MAINTENANCE LETTER (OUTPATIENT)
Age: 34
End: 2024-07-28

## 2025-04-24 ENCOUNTER — LAB REQUISITION (OUTPATIENT)
Dept: LAB | Facility: CLINIC | Age: 35
End: 2025-04-24
Payer: COMMERCIAL

## 2025-04-24 DIAGNOSIS — Z34.01 ENCOUNTER FOR SUPERVISION OF NORMAL FIRST PREGNANCY, FIRST TRIMESTER: ICD-10-CM

## 2025-04-24 LAB
ERYTHROCYTE [DISTWIDTH] IN BLOOD BY AUTOMATED COUNT: 12.9 % (ref 10–15)
HCT VFR BLD AUTO: 37 % (ref 35–47)
HGB BLD-MCNC: 12.6 G/DL (ref 11.7–15.7)
HIV 1+2 AB+HIV1 P24 AG SERPL QL IA: NONREACTIVE
MCH RBC QN AUTO: 28.6 PG (ref 26.5–33)
MCHC RBC AUTO-ENTMCNC: 34.1 G/DL (ref 31.5–36.5)
MCV RBC AUTO: 84 FL (ref 78–100)
PLATELET # BLD AUTO: 263 10E3/UL (ref 150–450)
RBC # BLD AUTO: 4.4 10E6/UL (ref 3.8–5.2)
WBC # BLD AUTO: 10.2 10E3/UL (ref 4–11)

## 2025-04-24 PROCEDURE — 86780 TREPONEMA PALLIDUM: CPT | Mod: ORL

## 2025-04-24 PROCEDURE — 86762 RUBELLA ANTIBODY: CPT | Mod: ORL

## 2025-04-24 PROCEDURE — 87389 HIV-1 AG W/HIV-1&-2 AB AG IA: CPT | Mod: ORL

## 2025-04-24 PROCEDURE — 85027 COMPLETE CBC AUTOMATED: CPT | Mod: ORL

## 2025-04-24 PROCEDURE — 87340 HEPATITIS B SURFACE AG IA: CPT | Mod: ORL

## 2025-04-24 PROCEDURE — 86706 HEP B SURFACE ANTIBODY: CPT | Mod: ORL

## 2025-04-24 PROCEDURE — 86803 HEPATITIS C AB TEST: CPT | Mod: ORL

## 2025-04-24 PROCEDURE — 86901 BLOOD TYPING SEROLOGIC RH(D): CPT | Mod: ORL

## 2025-04-25 LAB
ABO + RH BLD: NORMAL
BLD GP AB SCN SERPL QL: NEGATIVE
HBV SURFACE AB SERPL IA-ACNC: 4.42 M[IU]/ML
HBV SURFACE AB SERPL IA-ACNC: NONREACTIVE M[IU]/ML
HBV SURFACE AG SERPL QL IA: NONREACTIVE
HCV AB SERPL QL IA: NONREACTIVE
RUBV IGG SERPL QL IA: 9.16 INDEX
RUBV IGG SERPL QL IA: POSITIVE
SPECIMEN EXP DATE BLD: NORMAL
T PALLIDUM AB SER QL: NONREACTIVE

## 2025-05-22 ENCOUNTER — LAB REQUISITION (OUTPATIENT)
Dept: LAB | Facility: CLINIC | Age: 35
End: 2025-05-22
Payer: COMMERCIAL

## 2025-05-22 ENCOUNTER — TRANSCRIBE ORDERS (OUTPATIENT)
Dept: MATERNAL FETAL MEDICINE | Facility: CLINIC | Age: 35
End: 2025-05-22
Payer: COMMERCIAL

## 2025-05-22 DIAGNOSIS — Z34.82 ENCOUNTER FOR SUPERVISION OF OTHER NORMAL PREGNANCY, SECOND TRIMESTER: ICD-10-CM

## 2025-05-22 DIAGNOSIS — O26.90 PREGNANCY RELATED CONDITION, ANTEPARTUM: Primary | ICD-10-CM

## 2025-05-22 PROCEDURE — 87591 N.GONORRHOEAE DNA AMP PROB: CPT | Mod: ORL

## 2025-05-23 LAB
C TRACH DNA SPEC QL PROBE+SIG AMP: NEGATIVE
N GONORRHOEA DNA SPEC QL NAA+PROBE: NEGATIVE
SPECIMEN TYPE: NORMAL